# Patient Record
Sex: FEMALE | Race: WHITE | URBAN - METROPOLITAN AREA
[De-identification: names, ages, dates, MRNs, and addresses within clinical notes are randomized per-mention and may not be internally consistent; named-entity substitution may affect disease eponyms.]

---

## 2022-06-22 ENCOUNTER — OFFICE VISIT (OUTPATIENT)
Dept: URBAN - METROPOLITAN AREA CLINIC 54 | Facility: CLINIC | Age: 42
End: 2022-06-22
Payer: COMMERCIAL

## 2022-06-22 ENCOUNTER — WEB ENCOUNTER (OUTPATIENT)
Dept: URBAN - METROPOLITAN AREA CLINIC 54 | Facility: CLINIC | Age: 42
End: 2022-06-22

## 2022-06-22 VITALS
RESPIRATION RATE: 16 BRPM | BODY MASS INDEX: 22.36 KG/M2 | TEMPERATURE: 98.1 F | HEIGHT: 69 IN | WEIGHT: 151 LBS | DIASTOLIC BLOOD PRESSURE: 88 MMHG | HEART RATE: 68 BPM | SYSTOLIC BLOOD PRESSURE: 141 MMHG

## 2022-06-22 DIAGNOSIS — R19.7 DIARRHEA OF PRESUMED INFECTIOUS ORIGIN: ICD-10-CM

## 2022-06-22 DIAGNOSIS — R10.84 GENERALIZED ABDOMINAL PAIN: ICD-10-CM

## 2022-06-22 DIAGNOSIS — K52.9 COLITIS: ICD-10-CM

## 2022-06-22 DIAGNOSIS — R10.13 DYSPEPSIA: ICD-10-CM

## 2022-06-22 PROCEDURE — 99204 OFFICE O/P NEW MOD 45 MIN: CPT

## 2022-06-22 PROCEDURE — 99204 OFFICE O/P NEW MOD 45 MIN: CPT | Performed by: INTERNAL MEDICINE

## 2022-06-22 RX ORDER — SPIRONOLACTONE 50 MG/1
1 TABLET TABLET, FILM COATED ORAL ONCE A DAY
Status: ACTIVE | COMMUNITY

## 2022-06-22 RX ORDER — DICYCLOMINE HYDROCHLORIDE 20 MG/1
1 TABLET TABLET ORAL
Qty: 90 | Refills: 0 | OUTPATIENT

## 2022-06-22 RX ORDER — OMEPRAZOLE 20 MG/1
1 CAPSULE 30 MINUTES BEFORE MORNING MEAL CAPSULE, DELAYED RELEASE ORAL ONCE A DAY
Qty: 60 | Refills: 0 | OUTPATIENT

## 2022-06-22 RX ORDER — CIPROFLOXACIN HYDROCHLORIDE 500 MG/1
1 TABLET TABLET, FILM COATED ORAL
Status: ACTIVE | COMMUNITY

## 2022-06-22 RX ORDER — METRONIDAZOLE 375 MG/1
2 CAPSULES CAPSULE ORAL
Status: ACTIVE | COMMUNITY

## 2022-06-22 NOTE — HPI-TODAY'S VISIT:
6/22/22: Pt is a healthy 41 yo female who presents for [facility] ED follow up from 6/19 for colitis. Pt states over the weekend she had sudden onset abdominal pain, diarrhea, nausea without vomiting, and low grade fever after going out to eat and having a chicken sandwhich. Pt presented to the ED and had a CT scan which revealed thickening or TI, right colon, and proximal transverse colon. Pt received IV fluids, pain meds, and abx and was discharged on Cipro/Flagyl for 7 days. Still having some generalized abd pain, worst in epigastrium. Having 8 loose BMs daily. Denies hematochezia, melena, unintentional weight loss, or persistent fever. Pt has never had anything like this before. No sick contacts. No family history of colon cancer or IBD. Father had autoimmune hepatitis. Pt has never had EGD or Cscope. No pervious similar episodes. Pt admits to taking NSAIDs almost daily. Unremarkable labs.   CT abd/pelvis with contrast 6/19/22: - Abnormal thickening of the right colon and proximal transverse colon. Abnormal segmental thickening of the distal/terminal ileum. There is surrounding inflammatory changes and fluid. Findings are most compatible with infectious versus inflammatory ileocolitis. - Visualized appendix is normal - Bladder wall thickening, nonspecific, could be due to reactive thickening or perhaps mild cystitis. Correlate with symptoms and urinalysis. - Other incidental findings as above

## 2022-06-22 NOTE — PHYSICAL EXAM GASTROINTESTINAL
Abdomen , soft, generalized abd tenderness, nondistended , no guarding or rigidity , no masses palpable , normal bowel sounds , Liver and Spleen , no hepatomegaly present , no hepatosplenomegaly , liver nontender , spleen not palpable

## 2022-06-23 ENCOUNTER — WEB ENCOUNTER (OUTPATIENT)
Dept: URBAN - METROPOLITAN AREA CLINIC 54 | Facility: CLINIC | Age: 42
End: 2022-06-23

## 2022-06-24 ENCOUNTER — WEB ENCOUNTER (OUTPATIENT)
Dept: URBAN - METROPOLITAN AREA CLINIC 54 | Facility: CLINIC | Age: 42
End: 2022-06-24

## 2022-06-26 LAB
A/G RATIO: 1.2
ALBUMIN: 4.2
ALKALINE PHOSPHATASE: 78
ALT (SGPT): 42
AST (SGOT): 30
BASO (ABSOLUTE): 0
BASOS: 0
BILIRUBIN, TOTAL: 0.2
BUN/CREATININE RATIO: 8
BUN: 7
C-REACTIVE PROTEIN, QUANT: (no result)
CALCIUM: 9.5
CARBON DIOXIDE, TOTAL: 16
CHLORIDE: 104
CREATININE: 0.83
EGFR: 91
EOS (ABSOLUTE): 0
EOS: 1
GLOBULIN, TOTAL: 3.4
GLUCOSE: 80
HEMATOCRIT: 41.7
HEMATOLOGY COMMENTS:: (no result)
HEMOGLOBIN: 13.8
IMMATURE CELLS: (no result)
IMMATURE GRANS (ABS): 0
IMMATURE GRANULOCYTES: 0
LYMPHS (ABSOLUTE): 1.1
LYMPHS: 18
MCH: 30.2
MCHC: 33.1
MCV: 91
MONOCYTES(ABSOLUTE): 0.5
MONOCYTES: 8
NEUTROPHILS (ABSOLUTE): 4.3
NEUTROPHILS: 73
NRBC: (no result)
PLATELETS: 126
POTASSIUM: 3.9
PROTEIN, TOTAL: 7.6
RBC: 4.57
RDW: 11.8
REQUEST PROBLEM: (no result)
SODIUM: 140
WBC: 5.9

## 2022-06-28 ENCOUNTER — TELEPHONE ENCOUNTER (OUTPATIENT)
Dept: URBAN - METROPOLITAN AREA CLINIC 54 | Facility: CLINIC | Age: 42
End: 2022-06-28

## 2022-06-28 ENCOUNTER — LAB OUTSIDE AN ENCOUNTER (OUTPATIENT)
Dept: URBAN - METROPOLITAN AREA CLINIC 54 | Facility: CLINIC | Age: 42
End: 2022-06-28

## 2022-06-28 LAB
ADENOVIRUS F 40/41: NOT DETECTED
ASTROVIRUS: NOT DETECTED
C DIFFICILE TOXIN A/B: NOT DETECTED
CALPROTECTIN, FECAL: 126
CAMPYLOBACTER: DETECTED
CRYPTOSPORIDIUM: NOT DETECTED
CYCLOSPORA CAYETANENSIS: NOT DETECTED
E COLI O157: (no result)
ENTAMOEBA HISTOLYTICA: NOT DETECTED
ENTEROAGGREGATIVE E COLI: NOT DETECTED
ENTEROPATHOGENIC E COLI: NOT DETECTED
ENTEROTOXIGENIC E COLI: NOT DETECTED
GIARDIA LAMBLIA: NOT DETECTED
H. PYLORI STOOL AG, EIA: NEGATIVE
NOROVIRUS GI/GII: NOT DETECTED
PLESIOMONAS SHIGELLOIDES: NOT DETECTED
ROTAVIRUS A: NOT DETECTED
SALMONELLA: NOT DETECTED
SAPOVIRUS: NOT DETECTED
SHIGA-TOXIN-PRODUCING E COLI: NOT DETECTED
SHIGELLA/ENTEROINVASIVE E COLI: NOT DETECTED
VIBRIO CHOLERAE: NOT DETECTED
VIBRIO: NOT DETECTED
YERSINIA ENTEROCOLITICA: NOT DETECTED

## 2022-06-28 RX ORDER — AZITHROMYCIN MONOHYDRATE 500 MG/1
1 TABLET TABLET, FILM COATED ORAL ONCE A DAY
Qty: 3 TABLET | Refills: 0 | OUTPATIENT
Start: 2022-06-28 | End: 2022-07-01

## 2022-06-29 ENCOUNTER — TELEPHONE ENCOUNTER (OUTPATIENT)
Dept: URBAN - METROPOLITAN AREA CLINIC 54 | Facility: CLINIC | Age: 42
End: 2022-06-29

## 2022-07-12 ENCOUNTER — WEB ENCOUNTER (OUTPATIENT)
Dept: URBAN - METROPOLITAN AREA CLINIC 54 | Facility: CLINIC | Age: 42
End: 2022-07-12

## 2022-07-21 ENCOUNTER — DASHBOARD ENCOUNTERS (OUTPATIENT)
Age: 42
End: 2022-07-21

## 2022-07-27 ENCOUNTER — OFFICE VISIT (OUTPATIENT)
Dept: URBAN - METROPOLITAN AREA CLINIC 54 | Facility: CLINIC | Age: 42
End: 2022-07-27

## 2022-07-27 RX ORDER — OMEPRAZOLE 20 MG/1
1 CAPSULE 30 MINUTES BEFORE MORNING MEAL CAPSULE, DELAYED RELEASE ORAL ONCE A DAY
Qty: 60 | Refills: 0 | COMMUNITY

## 2022-07-27 RX ORDER — SPIRONOLACTONE 50 MG/1
1 TABLET TABLET, FILM COATED ORAL ONCE A DAY
COMMUNITY

## 2022-07-27 RX ORDER — METRONIDAZOLE 375 MG/1
2 CAPSULES CAPSULE ORAL
COMMUNITY

## 2022-07-27 RX ORDER — DICYCLOMINE HYDROCHLORIDE 20 MG/1
1 TABLET TABLET ORAL
Qty: 90 | Refills: 0 | COMMUNITY

## 2022-07-27 RX ORDER — CIPROFLOXACIN HYDROCHLORIDE 500 MG/1
1 TABLET TABLET, FILM COATED ORAL
COMMUNITY

## 2022-08-19 ENCOUNTER — ERX REFILL RESPONSE (OUTPATIENT)
Dept: URBAN - METROPOLITAN AREA CLINIC 54 | Facility: CLINIC | Age: 42
End: 2022-08-19

## 2022-08-19 RX ORDER — DICYCLOMINE HYDROCHLORIDE 20 MG/1
1 TABLET TABLET ORAL
Qty: 90 | Refills: 0 | OUTPATIENT

## 2022-08-19 RX ORDER — DICYCLOMINE HYDROCHLORIDE 20 MG/1
TAKE 1 TABLET BY MOUTH THREE TIMES DAILY AS NEEDED TABLET ORAL
Qty: 90 TABLET | Refills: 3 | OUTPATIENT

## 2022-12-16 ENCOUNTER — ERX REFILL RESPONSE (OUTPATIENT)
Dept: URBAN - METROPOLITAN AREA CLINIC 54 | Facility: CLINIC | Age: 42
End: 2022-12-16

## 2022-12-16 RX ORDER — DICYCLOMINE HYDROCHLORIDE 20 MG/1
TAKE 1 TABLET BY MOUTH THREE TIMES DAILY AS NEEDED TABLET ORAL
Qty: 90 TABLET | Refills: 0 | OUTPATIENT